# Patient Record
Sex: MALE | Race: WHITE | NOT HISPANIC OR LATINO | URBAN - METROPOLITAN AREA
[De-identification: names, ages, dates, MRNs, and addresses within clinical notes are randomized per-mention and may not be internally consistent; named-entity substitution may affect disease eponyms.]

---

## 2018-11-30 ENCOUNTER — OUTPATIENT (OUTPATIENT)
Dept: OUTPATIENT SERVICES | Facility: HOSPITAL | Age: 33
LOS: 1 days | Discharge: HOME | End: 2018-11-30

## 2018-11-30 DIAGNOSIS — N62 HYPERTROPHY OF BREAST: ICD-10-CM

## 2018-11-30 DIAGNOSIS — N63.10 UNSPECIFIED LUMP IN THE RIGHT BREAST, UNSPECIFIED QUADRANT: ICD-10-CM

## 2020-02-19 ENCOUNTER — APPOINTMENT (OUTPATIENT)
Dept: UROLOGY | Facility: CLINIC | Age: 35
End: 2020-02-19
Payer: COMMERCIAL

## 2020-02-19 VITALS
WEIGHT: 175 LBS | HEART RATE: 59 BPM | BODY MASS INDEX: 25.92 KG/M2 | SYSTOLIC BLOOD PRESSURE: 122 MMHG | DIASTOLIC BLOOD PRESSURE: 66 MMHG | HEIGHT: 69 IN

## 2020-02-19 DIAGNOSIS — F17.290 NICOTINE DEPENDENCE, OTHER TOBACCO PRODUCT, UNCOMPLICATED: ICD-10-CM

## 2020-02-19 DIAGNOSIS — Z80.8 FAMILY HISTORY OF MALIGNANT NEOPLASM OF OTHER ORGANS OR SYSTEMS: ICD-10-CM

## 2020-02-19 DIAGNOSIS — Z87.891 PERSONAL HISTORY OF NICOTINE DEPENDENCE: ICD-10-CM

## 2020-02-19 DIAGNOSIS — Z78.9 OTHER SPECIFIED HEALTH STATUS: ICD-10-CM

## 2020-02-19 PROBLEM — Z00.00 ENCOUNTER FOR PREVENTIVE HEALTH EXAMINATION: Status: ACTIVE | Noted: 2020-02-19

## 2020-02-19 PROCEDURE — 99203 OFFICE O/P NEW LOW 30 MIN: CPT

## 2020-02-19 NOTE — ASSESSMENT
[FreeTextEntry1] : He has a history of steroid use in the distant past however, his physical examination does not show any long-term effects. Physical examination reveals bilateral varicoceles, grade 2 on the left and grade 1 on the right.\par \par We will obtain a full hormonal panel, get a scrotal ultrasound, and a full semen analysis.\par \par He will follow up after a discussion

## 2020-02-19 NOTE — LETTER BODY
[Dear  ___] : Dear  [unfilled], [Consult Letter:] : I had the pleasure of evaluating your patient, [unfilled]. [Please see my note below.] : Please see my note below. [Consult Closing:] : Thank you very much for allowing me to participate in the care of this patient.  If you have any questions, please do not hesitate to contact me. [Sincerely,] : Sincerely, [FreeTextEntry2] : Dr. Jaren Howard  \par [address] [place] [address]\[place] [address]\par

## 2020-02-19 NOTE — PHYSICAL EXAM
[General Appearance - Well Developed] : well developed [Normal Appearance] : normal appearance [Well Groomed] : well groomed [General Appearance - Well Nourished] : well nourished [General Appearance - In No Acute Distress] : no acute distress [Edema] : no peripheral edema [Respiration, Rhythm And Depth] : normal respiratory rhythm and effort [Exaggerated Use Of Accessory Muscles For Inspiration] : no accessory muscle use [Abdomen Soft] : soft [Abdomen Tenderness] : non-tender [Costovertebral Angle Tenderness] : no ~M costovertebral angle tenderness [Urethral Meatus] : meatus normal [Penis Abnormality] : normal circumcised penis [Urinary Bladder Findings] : the bladder was normal on palpation [Scrotum] : the scrotum was normal [Epididymis] : the epididymides were normal [Testes Tenderness] : no tenderness of the testes [Testes Mass (___cm)] : there were no testicular masses [Normal Station and Gait] : the gait and station were normal for the patient's age [] : no rash [No Focal Deficits] : no focal deficits [Oriented To Time, Place, And Person] : oriented to person, place, and time [Affect] : the affect was normal [Mood] : the mood was normal [Not Anxious] : not anxious [Femoral Lymph Nodes Enlarged Bilaterally] : femoral [Inguinal Lymph Nodes Enlarged Bilaterally] : inguinal [FreeTextEntry1] : Testicles measured 26 cc bilaterally. There are bilateral varicoceles grade 2 on the left and grade 1 on the right. Patient refuses digital rectal examination

## 2020-02-19 NOTE — LETTER HEADER
[FreeTextEntry3] : Sherrie Alcocer M.D.\par Director of Urology\par Director of Male Infertility\par [facility]\par [address] [place] [address], [facility] [address]\[place] [address]

## 2020-02-19 NOTE — HISTORY OF PRESENT ILLNESS
[Currently Experiencing ___] :  [unfilled] [FreeTextEntry1] : Power is a 35-year-old male who was sent for consultation regarding known fertility.\par \par He and his significant other have been trying to conceive for 2-3 years, having unprotected intercourse 3 times per week, using timing method.\par \par He is an only child and denies ever getting anyone pregnant in the past. His significant other is 27 has 2 sisters, one has two children, one is single. 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 He was not followed by physician and was getting testosterone and other drugs from a friend. He discontinued testosterone use around the age of 25 has not had any since.\par \par He denies any difficulty with sexual activity, he has normal libido, and normal erections with normal ejaculation.

## 2020-03-11 ENCOUNTER — FORM ENCOUNTER (OUTPATIENT)
Age: 35
End: 2020-03-11

## 2020-03-12 ENCOUNTER — OUTPATIENT (OUTPATIENT)
Dept: OUTPATIENT SERVICES | Facility: HOSPITAL | Age: 35
LOS: 1 days | Discharge: HOME | End: 2020-03-12
Payer: COMMERCIAL

## 2020-03-12 DIAGNOSIS — N46.9 MALE INFERTILITY, UNSPECIFIED: ICD-10-CM

## 2020-03-12 LAB
ALBUMIN SERPL ELPH-MCNC: 4.8 G/DL
ALP BLD-CCNC: 77 U/L
ALT SERPL-CCNC: 26 U/L
AST SERPL-CCNC: 20 U/L
BASOPHILS # BLD AUTO: 0.1 K/UL
BASOPHILS NFR BLD AUTO: 1.5 %
BILIRUB DIRECT SERPL-MCNC: <0.2 MG/DL
BILIRUB INDIRECT SERPL-MCNC: >0.6 MG/DL
BILIRUB SERPL-MCNC: 0.8 MG/DL
EOSINOPHIL # BLD AUTO: 0.25 K/UL
EOSINOPHIL NFR BLD AUTO: 3.7 %
ESTRADIOL SERPL-MCNC: 38 PG/ML
HCT VFR BLD CALC: 52.4 %
HGB BLD-MCNC: 17.1 G/DL
IMM GRANULOCYTES NFR BLD AUTO: 0.3 %
LH SERPL-ACNC: 6.3 IU/L
LYMPHOCYTES # BLD AUTO: 2.57 K/UL
LYMPHOCYTES NFR BLD AUTO: 37.6 %
MAN DIFF?: NORMAL
MCHC RBC-ENTMCNC: 29.4 PG
MCHC RBC-ENTMCNC: 32.6 G/DL
MCV RBC AUTO: 90 FL
MONOCYTES # BLD AUTO: 0.58 K/UL
MONOCYTES NFR BLD AUTO: 8.5 %
NEUTROPHILS # BLD AUTO: 3.32 K/UL
NEUTROPHILS NFR BLD AUTO: 48.4 %
PLATELET # BLD AUTO: 298 K/UL
PROLACTIN SERPL-MCNC: 7.2 NG/ML
PROT SERPL-MCNC: 7.6 G/DL
RBC # BLD: 5.82 M/UL
RBC # FLD: 12 %
WBC # FLD AUTO: 6.84 K/UL

## 2020-03-12 PROCEDURE — 76870 US EXAM SCROTUM: CPT | Mod: 26

## 2020-03-16 LAB
% FREE TESTOSTERONE - ESO: 2.5 %
FREE TESTOSTERONE - ESO: 212 PG/ML
SHBG-ESOTERIX: 37.6 NMOL/L
TESTOSTERONE SERUM - ESO: 847 NG/DL

## 2020-03-18 LAB — SHBG SERPL-SCNC: 34 NMOL/L

## 2020-03-19 LAB
TESTOST BND SERPL-MCNC: 21 NG/DL
TESTOSTERONE BIOAVAILABLE: 322 NG/DL
TESTOSTERONE TOTAL S: 806 NG/DL

## 2020-03-25 ENCOUNTER — APPOINTMENT (OUTPATIENT)
Dept: UROLOGY | Facility: CLINIC | Age: 35
End: 2020-03-25

## 2020-08-21 ENCOUNTER — APPOINTMENT (OUTPATIENT)
Dept: UROLOGY | Facility: CLINIC | Age: 35
End: 2020-08-21
Payer: COMMERCIAL

## 2020-08-21 ENCOUNTER — EMERGENCY (EMERGENCY)
Facility: HOSPITAL | Age: 35
LOS: 1 days | Discharge: HOME | End: 2020-08-21
Attending: EMERGENCY MEDICINE | Admitting: EMERGENCY MEDICINE
Payer: OTHER MISCELLANEOUS

## 2020-08-21 VITALS
BODY MASS INDEX: 26.66 KG/M2 | TEMPERATURE: 98.3 F | HEART RATE: 67 BPM | HEIGHT: 69 IN | DIASTOLIC BLOOD PRESSURE: 77 MMHG | WEIGHT: 180 LBS | SYSTOLIC BLOOD PRESSURE: 143 MMHG

## 2020-08-21 VITALS
DIASTOLIC BLOOD PRESSURE: 98 MMHG | WEIGHT: 179.9 LBS | TEMPERATURE: 99 F | SYSTOLIC BLOOD PRESSURE: 150 MMHG | OXYGEN SATURATION: 98 % | HEART RATE: 65 BPM | RESPIRATION RATE: 18 BRPM

## 2020-08-21 DIAGNOSIS — I86.1 SCROTAL VARICES: ICD-10-CM

## 2020-08-21 DIAGNOSIS — N46.9 MALE INFERTILITY, UNSPECIFIED: ICD-10-CM

## 2020-08-21 PROCEDURE — 73630 X-RAY EXAM OF FOOT: CPT | Mod: 26

## 2020-08-21 PROCEDURE — 73630 X-RAY EXAM OF FOOT: CPT | Mod: 26,LT

## 2020-08-21 PROCEDURE — 73080 X-RAY EXAM OF ELBOW: CPT

## 2020-08-21 PROCEDURE — 99284 EMERGENCY DEPT VISIT MOD MDM: CPT

## 2020-08-21 PROCEDURE — 99284 EMERGENCY DEPT VISIT MOD MDM: CPT | Mod: 25

## 2020-08-21 PROCEDURE — 99214 OFFICE O/P EST MOD 30 MIN: CPT

## 2020-08-21 PROCEDURE — 73080 X-RAY EXAM OF ELBOW: CPT | Mod: 26

## 2020-08-21 PROCEDURE — 73080 X-RAY EXAM OF ELBOW: CPT | Mod: 26,RT

## 2020-08-21 PROCEDURE — 73630 X-RAY EXAM OF FOOT: CPT

## 2020-08-21 RX ORDER — IBUPROFEN 200 MG
800 TABLET ORAL ONCE
Refills: 0 | Status: COMPLETED | OUTPATIENT
Start: 2020-08-21 | End: 2020-08-21

## 2020-08-21 RX ORDER — IBUPROFEN 200 MG
1 TABLET ORAL
Qty: 20 | Refills: 0
Start: 2020-08-21

## 2020-08-21 RX ADMIN — Medication 800 MILLIGRAM(S): at 09:49

## 2020-08-21 NOTE — ED PROVIDER NOTE - PATIENT PORTAL LINK FT
You can access the FollowMyHealth Patient Portal offered by [facility] by registering at the following website: [email or web address]. By joining [facility]’s FollowMyHealth portal, you will also be able to view your health information using other applications (apps) compatible with our system.

## 2020-08-21 NOTE — ED PROVIDER NOTE - LAB INTERPRETATION
ER Physician: June Ree  Elbow xray INTERPRETATION: no acute fracture; no soft tissue swelling noted; normal bony alignment.  ER Physician: June Ree  Foot xray INTERPRETATION: no acute fracture; no soft tissue swelling noted; normal bony alignment.

## 2020-08-21 NOTE — ED ADULT NURSE NOTE - CHPI ED NUR SYMPTOMS NEG
no abrasion/no bleeding/no confusion/no deformity/no fever/no loss of consciousness/no numbness/no tingling/no vomiting/no weakness

## 2020-08-21 NOTE — ED PROVIDER NOTE - PHYSICAL EXAMINATION
VITAL SIGNS: I have reviewed nursing notes and confirm.  CONSTITUTIONAL: Well-developed; well-nourished; in no acute distress.   SKIN:  warm and dry, no acute rash.   HEAD:  normocephalic, atraumatic.  EYES: PERRL, EOM intact; conjunctiva and sclera clear.  ENT: No nasal discharge; airway clear.   NECK: Supple; non tender.  CARD: S1, S2 normal; no murmurs, gallops, or rubs. Regular rate and rhythm.   RESP:  Clear to auscultation b/l, no wheezes, rales or rhonchi.  ABD: Normal bowel sounds; soft; non-distended; non-tender; no guarding/ rebound. No CVA tenderness.  EXT: NO c-, t-, or l-spine tenderness. Mild ttp of left paralumbar muscles. RUE: No ttp of shoulder, elbow, or wrist joints. Does have mild ttp along the proximal forearm, ulnar side; slightly worse w/ flexion of the elbow joint. Supination and pronation intact. 2+ radial pulses b/l. Motor and sensation intact and equal b/l. Left foot: ttp to the 1st, 2nd, and 3rd metatarsal heads w/ no associated swelling, erythema, ecchymosis. No obvious deformities. No tenderness along the ankle, tibia, or knee joints. FROM at all  joints. DP/PT pulses 2+ b/l. Motor and sensation intact and equal b/l.   NEURO: Alert, oriented, grossly unremarkable  PSYCH: Cooperative, mood and affect appropriate. VITAL SIGNS: I have reviewed nursing notes and confirm.  CONSTITUTIONAL: Well-developed; well-nourished; in no acute distress.   SKIN:  warm and dry, no acute rash.   HEAD:  normocephalic, atraumatic.  EYES: PERRL, EOM intact; conjunctiva and sclera clear.  ENT: No nasal discharge; airway clear.   NECK: Supple; non tender.  BACK: No c-, t-, or l-spine tenderness. Mild ttp of left paralumbar muscles.   EXT: RUE: No ttp of shoulder, humerus, elbow, distal forearm, wrist, hand. Does have mild ttp along the proximal forearm, radial side; slightly worse w/ flexion of the elbow joint. Supination and pronation intact. 2+ radial pulses b/l. Motor and sensation intact and equal b/l. Left foot: ttp to the 1st, 2nd, and 3rd metatarsal heads w/ no associated swelling, erythema, ecchymosis. No obvious deformities. No tenderness along the ankle, tibia, or knee joints. FROM at all  joints. DP/PT pulses 2+ b/l. Motor and sensation intact and equal b/l.   NEURO: Alert, oriented, grossly unremarkable  PSYCH: Cooperative, mood and affect appropriate.

## 2020-08-21 NOTE — ED PROVIDER NOTE - OBJECTIVE STATEMENT
36 y/o M, a , with no significant PMHx presents to the ED with right elbow and left foot pain s/p slipping 3 feet down a ladder. Pt was in full gear and was using his right arm to hold onto a rung of the ladder. Pt is not sure if he hurt his left foot on a rung of the ladder or on the ground. Denies any head trauma, LOC, numbness, tingling, or weakness, and was able to ambulate afterwards, with pain. Pt also c/o mild left lower back pain, non radiating, with no associated numbness to the buttocks and no bowel or bladder dysfunction. Denies chest pain or SOB. 36 y/o M, a , with no significant PMHx presents to the ED with right elbow and left foot pain s/p slipping down a ladder yesterday. Pt was in full gear and was using his right arm to hold onto a rung of the ladder when he slipped. Pt is not sure if he hurt his left foot on a rung of the ladder or on the ground. Denies any head trauma, LOC, numbness, tingling, or weakness, and was able to ambulate afterwards, with pain. Pt also c/o mild left lower back pain, non radiating, with no associated numbness to the buttocks and no bowel or bladder dysfunction. Denies chest pain or SOB.

## 2020-08-21 NOTE — ED PROVIDER NOTE - CARE PLAN
Principal Discharge DX:	Contusion of left foot, initial encounter  Secondary Diagnosis:	Pain of right upper extremity

## 2020-08-21 NOTE — HISTORY OF PRESENT ILLNESS
[Currently Experiencing ___] :  [unfilled] [FreeTextEntry1] : seen 02/19/2020 for subfertility sent for blood sono semen analysis and is here for review

## 2020-08-21 NOTE — ED ADULT TRIAGE NOTE - CHIEF COMPLAINT QUOTE
Patient is a  who went on a call and slipped 3ft down but was able to catch himself and held onto a ledge.  Patient c/o pain to the bottom of his left foot and right elbow.  Denies loc, hitting his head, neck / back pain, chest / abdominal pain.  Patient ambulating w/ steady gait in no acute distress

## 2020-08-21 NOTE — ED ADULT NURSE NOTE - OBJECTIVE STATEMENT
pt is a 36 y/o M arriving to ED for c/c slip and fall 3ft down, but was able to catch himself and held onto a ledge.  Patient c/o pain to the bottom of his left foot and right elbow.  Denies loc, hitting his head, neck / back pain, chest / abdominal pain.  Patient ambulating w/ steady gait in no acute distress.

## 2020-08-21 NOTE — ED PROVIDER NOTE - CLINICAL SUMMARY MEDICAL DECISION MAKING FREE TEXT BOX
Impression: pt here s/p slipping approx 3 feet down the rungs of a ladder, w/ right forearm pain and left foot pain. Pt is NVI. Will obtain XRs of right elbow and left foot to r/o underlying fractures and give ibuprofen for pain. Impression: s/p slip on a ladder, w/ right forearm/ elbow and left foot pain. Pt is NVI. Will obtain XRs of right elbow and left foot to r/o underlying fractures and give ibuprofen for pain.    Xrays neg for acute injury. Suspect foot contusion and possible arm strain vs. partial distal biceps tendon tear. ED evaluation and management discussed with the patient in detail.  NSAIDS, sling, and close ortho follow up encouraged.  Strict ED return instructions discussed in detail and patient given the opportunity to ask any questions about their discharge diagnosis and instructions. Patient verbalized understanding. Impression: s/p slip on a ladder, w/ right forearm/ elbow and left foot pain. Pt is NVI. Will obtain XRs of right elbow and left foot to r/o underlying fractures and give ibuprofen for pain.    Xrays neg for acute injury. Suspect foot contusion and possible arm strain vs. distal biceps tendonitis vs. partial distal biceps tendon tear. ED evaluation and management discussed with the patient in detail.  NSAIDS, sling, and close ortho follow up encouraged.  Strict ED return instructions discussed in detail and patient given the opportunity to ask any questions about their discharge diagnosis and instructions. Patient verbalized understanding.

## 2020-08-21 NOTE — LETTER BODY
[Dear  ___] : Dear  [unfilled], [Courtesy Letter:] : I had the pleasure of seeing your patient, [unfilled], in my office today. [Please see my note below.] : Please see my note below. [Sincerely,] : Sincerely, [FreeTextEntry2] : Jaren Howard MD\par [address] [place] [address]\[place] [address]\par

## 2020-08-21 NOTE — ED PROVIDER NOTE - CARE PROVIDER_API CALL
Oscar Montano  ORTHOPAEDIC SURGERY  [address], [address] [place] [address]  Phone: [phone]  Fax: [phone]  Follow Up Time:

## 2020-08-21 NOTE — ED PROVIDER NOTE - CHPI ED SYMPTOMS NEG
no head trauma, no bowel or bladder dysfunction, no chest pain, no SOB/no loss of consciousness/no numbness/no tingling/no weakness

## 2020-08-21 NOTE — LETTER HEADER
[FreeTextEntry3] : Sherrie Alcocer M.D.\par Director of Urology\par Director of Male Infertility\par [facility]\par [address] [place] [address], [facility] [address]\[place] [address]\par

## 2020-08-21 NOTE — PHYSICAL EXAM
[General Appearance - Well Nourished] : well nourished [General Appearance - Well Developed] : well developed [Normal Appearance] : normal appearance [General Appearance - In No Acute Distress] : no acute distress [Well Groomed] : well groomed [Oriented To Time, Place, And Person] : oriented to person, place, and time [Affect] : the affect was normal [Mood] : the mood was normal [Normal Station and Gait] : the gait and station were normal for the patient's age [Not Anxious] : not anxious [FreeTextEntry1] : just hurt  right arm and left leg

## 2020-08-21 NOTE — ASSESSMENT
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 There is nothing they can do to improve him there's nothing they can do to improve her and this falls under the concept of idiopathic. It could be immune infertility where she has an allergy to his sperm and if that's the case are you why without or with ovarian hyperstimulation may help. Alternatively that may not work as well they may need IVF. This is something she will talk with her gynecologist and they will then decide on whether or not to go to a reproductive endocrinologist
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
## 2020-08-25 DIAGNOSIS — M25.521 PAIN IN RIGHT ELBOW: ICD-10-CM
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
## 2020-08-25 DIAGNOSIS — S90.32XA CONTUSION OF LEFT FOOT, INITIAL ENCOUNTER: ICD-10-CM
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
## 2020-11-10 ENCOUNTER — APPOINTMENT (OUTPATIENT)
Dept: PLASTIC SURGERY | Facility: CLINIC | Age: 35
End: 2020-11-10
Payer: COMMERCIAL

## 2020-11-10 VITALS — WEIGHT: 185 LBS | BODY MASS INDEX: 26.48 KG/M2 | HEIGHT: 70 IN

## 2020-11-10 PROCEDURE — 99212 OFFICE O/P EST SF 10 MIN: CPT

## 2020-11-10 PROCEDURE — 99072 ADDL SUPL MATRL&STAF TM PHE: CPT

## 2020-11-10 NOTE — ASSESSMENT
[FreeTextEntry1] : 34 y/o M with left scalp benign appearing skin lesion and multiple cysts: left postauricular scalp, posterior neck, and upper back\par \par will excise left postauricular cyst and mid upper back cyst\par \par Regarding the procedure, we discussed scarring, poor wound healing, bleeding, infection, need for additional surgery, and dissatisfaction with the outcome.  Also discussed possibility of keloid and/or hypertrophic scar formation as well as recurrence.  All questions were answered and risks understood.\par \par Due to COVID 19, pre-visit patient instructions were explained to the patient and their symptoms were checked upon arrival.  \par Masks were used by the health care providers and staff and the examination room was cleaned after the patient visit was completed.\par

## 2020-11-10 NOTE — HISTORY OF PRESENT ILLNESS
[FreeTextEntry1] : Pt is a 34 y/o M with no significant PMH who presents for evaluation of three skin lesions: back cyst x several years. Reports occasional drainage, not spontaneously. C/o left postauricular cyst x 1 year, pt tried to drain with mild improvement only. \par \par Nonsmoker, nondiabetic

## 2020-11-10 NOTE — PHYSICAL EXAM
[de-identified] : well-appearing, NAD [de-identified] : Left postauricular scalp with 1cm subcutaneous cystic lesion, mobile, nontender\par Left temporoparietal scalp with 3mm raised red pigmented skin lesion with irregular borders\par Posterior neck with 2cm subcutaneous cystic lesion, quiescent\par  [de-identified] : Upper back, slightly left of midline with 1.5 x 1 cm subcutaneous cystic lesion with overlying punctum, no active drainage

## 2021-01-13 ENCOUNTER — APPOINTMENT (OUTPATIENT)
Dept: PLASTIC SURGERY | Facility: CLINIC | Age: 36
End: 2021-01-13
Payer: COMMERCIAL

## 2021-01-13 PROCEDURE — 99072 ADDL SUPL MATRL&STAF TM PHE: CPT

## 2021-01-13 PROCEDURE — 13100 CMPLX RPR TRUNK 1.1-2.5 CM: CPT | Mod: 59

## 2021-01-13 PROCEDURE — 11402 EXC TR-EXT B9+MARG 1.1-2 CM: CPT

## 2021-01-13 PROCEDURE — 13151 CMPLX RPR E/N/E/L 1.1-2.5 CM: CPT | Mod: 59

## 2021-01-13 PROCEDURE — 11442 EXC FACE-MM B9+MARG 1.1-2 CM: CPT | Mod: 59

## 2021-01-13 NOTE — PROCEDURE
[FreeTextEntry6] : Patient is a 35 year old male with a Left postauricular cyst measuring approximately 2.1 x 2.1 and mid upper back cyst measuring approximately 1.5 x 1.5  cm.  \par \par The area was prepped and draped in the usual fashion.  Local anesthetic was administered using 1% lidocaine with epinephrine.\par \par The cysts were sharply excised.  Area was irrigated copiously.  Complex wound closure was performed in layers.  The postauricular wound measured approximately 2.5  cm and the back wound measured 2 cm.\par \par Sterile dressing applied.  \par \par Patient tolerated procedure well and understands post-op instructions.\par \par Sutures Used:  4-0 Nylon, 3-0 Nylon\par \par Due to COVID 19, pre-visit patient instructions were explained to the patient and their symptoms were checked upon arrival.  \par Masks were used by the health care providers and staff and the examination room was cleaned after the patient visit was completed.\par \par \par

## 2021-01-27 ENCOUNTER — APPOINTMENT (OUTPATIENT)
Dept: PLASTIC SURGERY | Facility: CLINIC | Age: 36
End: 2021-01-27
Payer: COMMERCIAL

## 2021-01-27 DIAGNOSIS — D48.5 NEOPLASM OF UNCERTAIN BEHAVIOR OF SKIN: ICD-10-CM

## 2021-01-27 DIAGNOSIS — L72.0 EPIDERMAL CYST: ICD-10-CM

## 2021-01-27 PROCEDURE — 99212 OFFICE O/P EST SF 10 MIN: CPT

## 2021-01-27 PROCEDURE — 99072 ADDL SUPL MATRL&STAF TM PHE: CPT

## 2021-01-27 NOTE — HISTORY OF PRESENT ILLNESS
[FreeTextEntry1] : Pt is a 36 y/o M with no significant PMH who presents for evaluation of three skin lesions: back cyst x several years. Reports occasional drainage, not spontaneously. C/o left postauricular cyst x 1 year, pt tried to drain with mild improvement only. \par \par Nonsmoker, nondiabetic\par \par Interval hx (1/27/21). Patient presents today POD#14 s/p excision of upper back and left postauricular cysts. Doing well and reporting no significant pain, f/c or bleeding.

## 2021-01-27 NOTE — DATA REVIEWED
[FreeTextEntry1] : Pathology 1/13/21- upper back- epidermal inclusion cyst, left postauricular area- epidermal inclusion cyst

## 2021-01-27 NOTE — PHYSICAL EXAM
[de-identified] : Left postauricular scalp incision c/d/i with minimal swelling, no cellulitis or fluid collection \par Left temporoparietal scalp with 3mm raised red pigmented skin lesion with irregular borders\par Posterior neck with 2cm subcutaneous cystic lesion, quiescent\par  [de-identified] : well-appearing, NAD [de-identified] : Upper back, slightly left of midline with well-healing incision, c/d/i, minimal swelling, no erythema or cellulitis

## 2021-01-27 NOTE — ASSESSMENT
[FreeTextEntry1] : 35 y/o M with left scalp benign appearing skin lesion and multiple cysts: left postauricular scalp, posterior neck, and upper back now POD#14 s/p excision of  left postauricular cyst and mid upper back cyst. Doing well. Pathology reveals epidermal inclusion cysts. \par \par - sutures removed, steri strips applied\par - daily Aquaphor\par - pathology discussed \par - post-op instructions reviewed\par - f/u PRN \par \par Due to COVID 19, pre-visit patient instructions were explained to the patient and their symptoms were checked upon arrival.  \par Masks were used by the health care providers and staff and the examination room was cleaned after the patient visit was completed.\par

## 2023-12-06 ENCOUNTER — APPOINTMENT (OUTPATIENT)
Dept: NEUROLOGY | Facility: CLINIC | Age: 38
End: 2023-12-06
Payer: COMMERCIAL

## 2023-12-06 VITALS
SYSTOLIC BLOOD PRESSURE: 125 MMHG | BODY MASS INDEX: 27.47 KG/M2 | HEIGHT: 67 IN | HEART RATE: 58 BPM | WEIGHT: 175 LBS | DIASTOLIC BLOOD PRESSURE: 81 MMHG

## 2023-12-06 DIAGNOSIS — M54.16 RADICULOPATHY, LUMBAR REGION: ICD-10-CM

## 2023-12-06 PROCEDURE — 99204 OFFICE O/P NEW MOD 45 MIN: CPT

## 2023-12-13 ENCOUNTER — APPOINTMENT (OUTPATIENT)
Dept: NEUROLOGY | Facility: CLINIC | Age: 38
End: 2023-12-13

## 2023-12-26 ENCOUNTER — APPOINTMENT (OUTPATIENT)
Dept: MRI IMAGING | Facility: CLINIC | Age: 38
End: 2023-12-26

## 2024-01-05 ENCOUNTER — APPOINTMENT (OUTPATIENT)
Dept: NEUROLOGY | Facility: CLINIC | Age: 39
End: 2024-01-05

## 2025-03-19 ENCOUNTER — NON-APPOINTMENT (OUTPATIENT)
Age: 40
End: 2025-03-19